# Patient Record
Sex: FEMALE | Race: WHITE | Employment: OTHER | URBAN - METROPOLITAN AREA
[De-identification: names, ages, dates, MRNs, and addresses within clinical notes are randomized per-mention and may not be internally consistent; named-entity substitution may affect disease eponyms.]

---

## 2022-04-28 ENCOUNTER — HOSPITAL ENCOUNTER (OUTPATIENT)
Dept: RADIATION THERAPY | Age: 87
Discharge: HOME | End: 2022-04-28
Payer: MEDICARE

## 2022-04-28 PROCEDURE — 99205 OFFICE O/P NEW HI 60 MIN: CPT | Performed by: RADIOLOGY

## 2022-04-28 PROCEDURE — 99211 OFF/OP EST MAY X REQ PHY/QHP: CPT

## 2022-05-20 ENCOUNTER — HOSPITAL ENCOUNTER (OUTPATIENT)
Dept: RADIATION THERAPY | Age: 87
Discharge: HOME | End: 2022-05-20
Payer: MEDICARE

## 2022-05-25 ENCOUNTER — HOSPITAL ENCOUNTER (OUTPATIENT)
Dept: RADIATION THERAPY | Age: 87
Discharge: HOME | End: 2022-05-25
Payer: MEDICARE

## 2022-05-25 PROCEDURE — 77290 THER RAD SIMULAJ FIELD CPLX: CPT

## 2022-05-25 PROCEDURE — 77334 RADIATION TREATMENT AID(S): CPT

## 2022-05-31 ENCOUNTER — HOSPITAL ENCOUNTER (OUTPATIENT)
Dept: RADIATION THERAPY | Age: 87
Discharge: HOME | End: 2022-05-31
Payer: MEDICARE

## 2022-05-31 PROCEDURE — 77263 THER RADIOLOGY TX PLNG CPLX: CPT | Performed by: RADIOLOGY

## 2022-06-01 ENCOUNTER — HOSPITAL ENCOUNTER (OUTPATIENT)
Dept: RADIATION THERAPY | Age: 87
Discharge: HOME | End: 2022-06-01
Payer: MEDICARE

## 2022-06-01 PROCEDURE — 77334 RADIATION TREATMENT AID(S): CPT

## 2022-06-01 PROCEDURE — 77300 RADIATION THERAPY DOSE PLAN: CPT

## 2022-06-02 ENCOUNTER — HOSPITAL ENCOUNTER (OUTPATIENT)
Dept: RADIATION THERAPY | Age: 87
Discharge: HOME | End: 2022-06-02
Payer: MEDICARE

## 2022-06-02 PROCEDURE — 77427 RADIATION TX MANAGEMENT X5: CPT | Performed by: RADIOLOGY

## 2022-06-02 PROCEDURE — 77412 RADIATION TX DELIVERY LVL 3: CPT

## 2022-06-02 PROCEDURE — 77280 THER RAD SIMULAJ FIELD SMPL: CPT

## 2022-06-06 ENCOUNTER — HOSPITAL ENCOUNTER (OUTPATIENT)
Dept: RADIATION THERAPY | Age: 87
Discharge: HOME | End: 2022-06-06
Payer: MEDICARE

## 2022-06-06 PROCEDURE — 77412 RADIATION TX DELIVERY LVL 3: CPT

## 2022-06-09 ENCOUNTER — HOSPITAL ENCOUNTER (OUTPATIENT)
Dept: RADIATION THERAPY | Age: 87
Discharge: HOME | End: 2022-06-09
Payer: MEDICARE

## 2022-06-09 PROCEDURE — 77412 RADIATION TX DELIVERY LVL 3: CPT

## 2022-06-13 ENCOUNTER — HOSPITAL ENCOUNTER (OUTPATIENT)
Dept: RADIATION THERAPY | Age: 87
Discharge: HOME | End: 2022-06-13
Payer: MEDICARE

## 2022-06-13 PROCEDURE — 77412 RADIATION TX DELIVERY LVL 3: CPT

## 2022-06-15 ENCOUNTER — HOSPITAL ENCOUNTER (OUTPATIENT)
Dept: RADIATION THERAPY | Age: 87
Discharge: HOME | End: 2022-06-15
Payer: MEDICARE

## 2022-06-15 PROCEDURE — 77412 RADIATION TX DELIVERY LVL 3: CPT

## 2022-06-15 PROCEDURE — 77336 RADIATION PHYSICS CONSULT: CPT

## 2022-07-28 ENCOUNTER — HOSPITAL ENCOUNTER (OUTPATIENT)
Dept: RADIATION THERAPY | Age: 87
Discharge: HOME | End: 2022-07-28
Payer: MEDICARE

## 2022-07-28 PROCEDURE — 99024 POSTOP FOLLOW-UP VISIT: CPT | Performed by: RADIOLOGY

## 2022-07-28 PROCEDURE — 99211 OFF/OP EST MAY X REQ PHY/QHP: CPT

## 2023-07-26 ENCOUNTER — HOSPITAL ENCOUNTER (EMERGENCY)
Facility: HOSPITAL | Age: 88
Discharge: HOME | End: 2023-07-26
Attending: EMERGENCY MEDICINE
Payer: MEDICARE

## 2023-07-26 ENCOUNTER — APPOINTMENT (OUTPATIENT)
Facility: HOSPITAL | Age: 88
End: 2023-07-26
Payer: MEDICARE

## 2023-07-26 VITALS
SYSTOLIC BLOOD PRESSURE: 138 MMHG | DIASTOLIC BLOOD PRESSURE: 64 MMHG | BODY MASS INDEX: 21.26 KG/M2 | OXYGEN SATURATION: 99 % | HEART RATE: 90 BPM | TEMPERATURE: 97.7 F | WEIGHT: 120 LBS | HEIGHT: 63 IN | RESPIRATION RATE: 20 BRPM

## 2023-07-26 DIAGNOSIS — S01.01XA LACERATION OF SCALP, INITIAL ENCOUNTER: ICD-10-CM

## 2023-07-26 DIAGNOSIS — S09.90XA INJURY OF HEAD, INITIAL ENCOUNTER: Primary | ICD-10-CM

## 2023-07-26 PROCEDURE — 72125 CT NECK SPINE W/O DYE: CPT

## 2023-07-26 PROCEDURE — 99284 EMERGENCY DEPT VISIT MOD MDM: CPT

## 2023-07-26 PROCEDURE — 12001 RPR S/N/AX/GEN/TRNK 2.5CM/<: CPT

## 2023-07-26 PROCEDURE — 70450 CT HEAD/BRAIN W/O DYE: CPT

## 2023-07-26 ASSESSMENT — PAIN SCALES - WONG BAKER: WONGBAKER_NUMERICALRESPONSE: 2

## 2023-07-26 ASSESSMENT — LIFESTYLE VARIABLES
HOW OFTEN DO YOU HAVE A DRINK CONTAINING ALCOHOL: NEVER
HOW MANY STANDARD DRINKS CONTAINING ALCOHOL DO YOU HAVE ON A TYPICAL DAY: PATIENT DOES NOT DRINK

## 2023-07-26 ASSESSMENT — PAIN - FUNCTIONAL ASSESSMENT: PAIN_FUNCTIONAL_ASSESSMENT: WONG-BAKER FACES

## 2023-07-26 NOTE — ED TRIAGE NOTES
Pt sent from [facility] after falling and hitting the back of her head. Has small lac to the back of her head.  No LOC